# Patient Record
Sex: FEMALE | Race: WHITE
[De-identification: names, ages, dates, MRNs, and addresses within clinical notes are randomized per-mention and may not be internally consistent; named-entity substitution may affect disease eponyms.]

---

## 2020-02-20 ENCOUNTER — HOSPITAL ENCOUNTER (INPATIENT)
Dept: HOSPITAL 26 - MED | Age: 45
LOS: 5 days | Discharge: HOME | DRG: 330 | End: 2020-02-25
Attending: GENERAL PRACTICE | Admitting: GENERAL PRACTICE
Payer: COMMERCIAL

## 2020-02-20 VITALS — HEIGHT: 67 IN | WEIGHT: 206 LBS | BODY MASS INDEX: 32.33 KG/M2

## 2020-02-20 VITALS — SYSTOLIC BLOOD PRESSURE: 143 MMHG | DIASTOLIC BLOOD PRESSURE: 78 MMHG

## 2020-02-20 VITALS — SYSTOLIC BLOOD PRESSURE: 160 MMHG | DIASTOLIC BLOOD PRESSURE: 90 MMHG

## 2020-02-20 DIAGNOSIS — E87.6: ICD-10-CM

## 2020-02-20 DIAGNOSIS — E02: ICD-10-CM

## 2020-02-20 DIAGNOSIS — Z86.32: ICD-10-CM

## 2020-02-20 DIAGNOSIS — K56.1: Primary | ICD-10-CM

## 2020-02-20 DIAGNOSIS — E78.5: ICD-10-CM

## 2020-02-20 DIAGNOSIS — N39.0: ICD-10-CM

## 2020-02-20 DIAGNOSIS — E83.39: ICD-10-CM

## 2020-02-20 DIAGNOSIS — E83.51: ICD-10-CM

## 2020-02-20 DIAGNOSIS — E27.8: ICD-10-CM

## 2020-02-20 DIAGNOSIS — I10: ICD-10-CM

## 2020-02-20 DIAGNOSIS — D72.829: ICD-10-CM

## 2020-02-20 DIAGNOSIS — K44.9: ICD-10-CM

## 2020-02-20 DIAGNOSIS — R73.03: ICD-10-CM

## 2020-02-20 LAB
ALBUMIN FLD-MCNC: 3.4 G/DL (ref 3.4–5)
AMYLASE SERPL-CCNC: 77 U/L (ref 25–115)
ANION GAP SERPL CALCULATED.3IONS-SCNC: 16.4 MMOL/L (ref 8–16)
APPEARANCE UR: (no result)
AST SERPL-CCNC: 14 U/L (ref 15–37)
BARBITURATES UR QL SCN: (no result) NG/ML
BASOPHILS # BLD AUTO: 0 K/UL (ref 0–0.22)
BASOPHILS NFR BLD AUTO: 0.4 % (ref 0–2)
BENZODIAZ UR QL SCN: (no result) NG/ML
BILIRUB SERPL-MCNC: 0.3 MG/DL (ref 0–1)
BILIRUB UR QL STRIP: NEGATIVE
BUN SERPL-MCNC: 17 MG/DL (ref 7–18)
BZE UR QL SCN: (no result) NG/ML
CANNABINOIDS UR QL SCN: (no result) NG/ML
CHLORIDE SERPL-SCNC: 103 MMOL/L (ref 98–107)
CHOLEST/HDLC SERPL: 3.5 {RATIO} (ref 1–4.5)
CO2 SERPL-SCNC: 24.7 MMOL/L (ref 21–32)
COLOR UR: YELLOW
CREAT SERPL-MCNC: 0.8 MG/DL (ref 0.6–1.3)
EOSINOPHIL # BLD AUTO: 0.1 K/UL (ref 0–0.4)
EOSINOPHIL NFR BLD AUTO: 0.6 % (ref 0–4)
ERYTHROCYTE [DISTWIDTH] IN BLOOD BY AUTOMATED COUNT: 13.7 % (ref 11.6–13.7)
GFR SERPL CREATININE-BSD FRML MDRD: 101 ML/MIN (ref 90–?)
GLUCOSE SERPL-MCNC: 147 MG/DL (ref 74–106)
GLUCOSE UR STRIP-MCNC: NEGATIVE MG/DL
HCT VFR BLD AUTO: 37.6 % (ref 36–48)
HDLC SERPL-MCNC: 63 MG/DL (ref 40–60)
HGB BLD-MCNC: 12.5 G/DL (ref 12–16)
HGB UR QL STRIP: (no result)
LDLC SERPL CALC-MCNC: 127 MG/DL (ref 60–100)
LEUKOCYTE ESTERASE UR QL STRIP: (no result)
LIPASE SERPL-CCNC: 189 U/L (ref 73–393)
LYMPHOCYTES # BLD AUTO: 3.4 K/UL (ref 2.5–16.5)
LYMPHOCYTES NFR BLD AUTO: 35.3 % (ref 20.5–51.1)
MAGNESIUM SERPL-MCNC: 1.8 MG/DL (ref 1.8–2.4)
MCH RBC QN AUTO: 26 PG (ref 27–31)
MCHC RBC AUTO-ENTMCNC: 33 G/DL (ref 33–37)
MCV RBC AUTO: 77.9 FL (ref 80–94)
MONOCYTES # BLD AUTO: 0.6 K/UL (ref 0.8–1)
MONOCYTES NFR BLD AUTO: 6.8 % (ref 1.7–9.3)
NEUTROPHILS # BLD AUTO: 5.4 K/UL (ref 1.8–7.7)
NEUTROPHILS NFR BLD AUTO: 56.9 % (ref 42.2–75.2)
NITRITE UR QL STRIP: NEGATIVE
OPIATES UR QL SCN: (no result) NG/ML
PCP UR QL SCN: (no result) NG/ML
PH UR STRIP: 7.5 [PH] (ref 5–9)
PHOSPHATE SERPL-MCNC: 1.7 MG/DL (ref 2.5–4.9)
PLATELET # BLD AUTO: 403 K/UL (ref 140–450)
POTASSIUM SERPL-SCNC: 3.1 MMOL/L (ref 3.5–5.1)
PROTHROMBIN TIME: 9.3 SECS (ref 10.8–13.4)
RBC # BLD AUTO: 4.83 MIL/UL (ref 4.2–5.4)
RBC #/AREA URNS HPF: (no result) /HPF (ref 0–5)
SODIUM SERPL-SCNC: 141 MMOL/L (ref 136–145)
T4 FREE SERPL-MCNC: 1.07 NG/DL (ref 0.76–1.46)
TRIGL SERPL-MCNC: 157 MG/DL (ref 30–150)
TSH SERPL DL<=0.05 MIU/L-ACNC: 4.37 UIU/ML (ref 0.34–3.74)
WBC # BLD AUTO: 9.5 K/UL (ref 4.8–10.8)
WBC,URINE: (no result) /HPF (ref 0–5)

## 2020-02-20 PROCEDURE — C9113 INJ PANTOPRAZOLE SODIUM, VIA: HCPCS

## 2020-02-20 NOTE — NUR
PATIENT TAKEN TO CT IN [facility]. WILL GIVE MORPHINE AS ORDERED UPON RETURN.

## 2020-02-20 NOTE — NUR
PATIENT NG TUBE PLACED IN LEFT NARE. PATIENT TOLERATED WELL. STOMACH CONTENTS 
SEEN IN TUBE. 

-------------------------------------------------------------------------------

Addendum: 02/20/20 at 2123 by CODIE

-------------------------------------------------------------------------------

ASCULATED TO VERIFY PLACEMENT.  ORDERED XRAY TO VERIFY PLACEMENT.

## 2020-02-20 NOTE — NUR
PATIENT ADMITTED TO -A MED SURG UNIT. PATIENT ADMITTED UNDER THE CARE OF 
DR. DODD. REPORT GIVEN TO JANET KELLOGG. PATIENT STABLE AT TIME OF TRANSFER. KCL 
AND FLAGYL RUNNING IN [facility].

## 2020-02-20 NOTE — NUR
-------------------------------------------------------------------------------

            *** Note undone in EDM - 02/20/20 at 2040 by CODIE ***            

-------------------------------------------------------------------------------

PATEINT STATES SHE HAS HAD SUDDEN ONSET ABD PAIN ON AND OFF FOR 1 MONTH. STATES 
TODAY IT WAS WORSE. PATIENT DIAPHORETIC AND HUNCHED OVER IN PAIN DURING 
ASSESSMENT. MID ABD TENDERNESS NOTED. PATIENT STATES THE PAIN BEGAN 1 HOUR PTA 
AND IS GETTING WORSE. NO MEDICAL HX REPORTED. LUNGS CLEAR BILATERALLY. AAO. 
PATIENT LAYING IN BED, BE LOW LOCKED WITH SIDE RAIL UP ON ONE SIDE.  AT 
BEDSIDE.

## 2020-02-20 NOTE — NUR
RECEIVED BEDSIDE REPORT FROM [facility] RN. PT IS AWAKE BUT DROWSY. RESPONDS TO NAME. IS 
AAOX4. PT'S RESPIRATIONS ARE 12 EQUAL AND UNLABORED. LUNG SOUNDS ARE CLEAR. SKIN IS INTACT. 
C/C EPIGASTRIC PAIN X1 MO. WORSE TODAY. DX INTUSSUSCEPTION. WILL HAVE EMERGENCY SURGERY 
TONIGHT WITH SURGEON LORA. PT STATES LAST MEAL WAS TODAY AT 1800 AND HAD A BEER. 
CURRENTLY DENIES NAUSEA. HAS NGT ON L NOSTRIL TO GRAVITY. IV ON LAC 20G CURRENTLY ON K RIDER 
FOR K 3.1. PT NPO VERBALIZED UNDERSTANDING.  IS AT BEDSIDE. MRSA SWAB OBTAINED. VS: 
143/78 HR 87 RR 12 100% RA. PT WITH PAIN 8/10 HAS NOT HAD RELIEF PT RECEIVED MORPHINE BEFORE 
TRANSFERRING TO FLOOR. WILL REASSESS IN A FEW MINUTES. ORIENTED PT TO ROOM,STAFF, VISITING 
HOURS, AND CALL LIGHT. JUAN CARLOS OR RN IS AT BEDSIDE ALSO DOING HER ASSESSMENT.

## 2020-02-20 NOTE — NUR
PATEINT STATES SHE HAS HAD SUDDEN ONSET ABD PAIN ON AND OFF FOR 1 MONTH. STATES 
TODAY IT WAS WORSE. PATIENT DIAPHORETIC AND HUNCHED OVER IN PAIN DURING 
ASSESSMENT. MID ABD TENDERNESS NOTED. PATIENT STATES THE PAIN BEGAN 1 HOUR PTA 
AND IS GETTING WORSE. NO MEDICAL HX REPORTED. LUNGS CLEAR BILATERALLY. AAO. 
PATIENT LAYING IN BED, BE LOW LOCKED WITH SIDE RAIL UP ON ONE SIDE.  AT 
BEDSIDE.

## 2020-02-21 VITALS — SYSTOLIC BLOOD PRESSURE: 119 MMHG | DIASTOLIC BLOOD PRESSURE: 70 MMHG

## 2020-02-21 VITALS — SYSTOLIC BLOOD PRESSURE: 123 MMHG | DIASTOLIC BLOOD PRESSURE: 65 MMHG

## 2020-02-21 VITALS — SYSTOLIC BLOOD PRESSURE: 117 MMHG | DIASTOLIC BLOOD PRESSURE: 57 MMHG

## 2020-02-21 LAB
ANION GAP SERPL CALCULATED.3IONS-SCNC: 13.1 MMOL/L (ref 8–16)
BASOPHILS # BLD AUTO: 0 K/UL (ref 0–0.22)
BASOPHILS NFR BLD AUTO: 0.2 % (ref 0–2)
BUN SERPL-MCNC: 12 MG/DL (ref 7–18)
CHLORIDE SERPL-SCNC: 106 MMOL/L (ref 98–107)
CHOLEST/HDLC SERPL: 3.3 {RATIO} (ref 1–4.5)
CO2 SERPL-SCNC: 24 MMOL/L (ref 21–32)
CREAT SERPL-MCNC: 0.7 MG/DL (ref 0.6–1.3)
EOSINOPHIL # BLD AUTO: 0 K/UL (ref 0–0.4)
EOSINOPHIL NFR BLD AUTO: 0 % (ref 0–4)
ERYTHROCYTE [DISTWIDTH] IN BLOOD BY AUTOMATED COUNT: 13.6 % (ref 11.6–13.7)
GFR SERPL CREATININE-BSD FRML MDRD: 118 ML/MIN (ref 90–?)
GLUCOSE SERPL-MCNC: 186 MG/DL (ref 74–106)
HCT VFR BLD AUTO: 33 % (ref 36–48)
HDLC SERPL-MCNC: 67 MG/DL (ref 40–60)
HGB BLD-MCNC: 11.1 G/DL (ref 12–16)
LDLC SERPL CALC-MCNC: 123 MG/DL (ref 60–100)
LYMPHOCYTES # BLD AUTO: 0.3 K/UL (ref 2.5–16.5)
LYMPHOCYTES NFR BLD AUTO: 2 % (ref 20.5–51.1)
MCH RBC QN AUTO: 26 PG (ref 27–31)
MCHC RBC AUTO-ENTMCNC: 34 G/DL (ref 33–37)
MCV RBC AUTO: 78.3 FL (ref 80–94)
MONOCYTES # BLD AUTO: 1 K/UL (ref 0.8–1)
MONOCYTES NFR BLD AUTO: 5.9 % (ref 1.7–9.3)
NEUTROPHILS # BLD AUTO: 15.2 K/UL (ref 1.8–7.7)
NEUTROPHILS NFR BLD AUTO: 91.9 % (ref 42.2–75.2)
PLATELET # BLD AUTO: 261 K/UL (ref 140–450)
POTASSIUM SERPL-SCNC: 4.1 MMOL/L (ref 3.5–5.1)
RBC # BLD AUTO: 4.22 MIL/UL (ref 4.2–5.4)
SODIUM SERPL-SCNC: 139 MMOL/L (ref 136–145)
TRIGL SERPL-MCNC: 158 MG/DL (ref 30–150)
WBC # BLD AUTO: 16.5 K/UL (ref 4.8–10.8)

## 2020-02-21 PROCEDURE — 0DS80ZZ REPOSITION SMALL INTESTINE, OPEN APPROACH: ICD-10-PCS

## 2020-02-21 PROCEDURE — 3E1M38Z IRRIGATION OF PERITONEAL CAVITY USING IRRIGATING SUBSTANCE, PERCUTANEOUS APPROACH: ICD-10-PCS

## 2020-02-21 RX ADMIN — DEXTROSE AND SODIUM CHLORIDE SCH MLS/HR: 5; .9 INJECTION, SOLUTION INTRAVENOUS at 02:54

## 2020-02-21 RX ADMIN — Medication SCH DEV: at 20:29

## 2020-02-21 RX ADMIN — MORPHINE SULFATE PRN MG: 2 INJECTION, SOLUTION INTRAMUSCULAR; INTRAVENOUS at 18:34

## 2020-02-21 RX ADMIN — METHOCARBAMOL SCH MG: 100 INJECTION INTRAMUSCULAR; INTRAVENOUS at 20:11

## 2020-02-21 RX ADMIN — DEXTROSE AND SODIUM CHLORIDE SCH MLS/HR: 5; .9 INJECTION, SOLUTION INTRAVENOUS at 06:12

## 2020-02-21 RX ADMIN — PANTOPRAZOLE SODIUM SCH MG: 40 INJECTION, POWDER, FOR SOLUTION INTRAVENOUS at 08:48

## 2020-02-21 RX ADMIN — DEXTROSE SCH MLS/HR: 50 INJECTION, SOLUTION INTRAVENOUS at 11:58

## 2020-02-21 RX ADMIN — DEXTROSE AND SODIUM CHLORIDE SCH MLS/HR: 5; .9 INJECTION, SOLUTION INTRAVENOUS at 18:17

## 2020-02-21 RX ADMIN — MORPHINE SULFATE PRN MG: 2 INJECTION, SOLUTION INTRAMUSCULAR; INTRAVENOUS at 08:47

## 2020-02-21 RX ADMIN — MORPHINE SULFATE PRN MG: 2 INJECTION, SOLUTION INTRAMUSCULAR; INTRAVENOUS at 14:44

## 2020-02-21 RX ADMIN — Medication SCH DEV: at 17:04

## 2020-02-21 RX ADMIN — DEXTROSE SCH MLS/HR: 50 INJECTION, SOLUTION INTRAVENOUS at 06:12

## 2020-02-21 RX ADMIN — Medication SCH DEV: at 11:56

## 2020-02-21 RX ADMIN — MORPHINE SULFATE PRN MG: 2 INJECTION, SOLUTION INTRAMUSCULAR; INTRAVENOUS at 11:58

## 2020-02-21 RX ADMIN — DEXTROSE SCH MLS/HR: 50 INJECTION, SOLUTION INTRAVENOUS at 18:17

## 2020-02-21 NOTE — NUR
VITAL SIGNS ARE WITHIN NORMAL LIMITS. PT IS SLEEPING COMFORTABLY IN BED WITH EYES CLOSED. 
CHEST RISE AND FALL. CALL LIGHT IS WITHIN REACH. MOTHER IS AT BEDSIDE. WILL CONTINUE TO 
MONITOR PATIENT'S VITAL SIGNS.

## 2020-02-21 NOTE — NUR
PATIENT IS AWAKE, VERBALLY RESPONSIVE. PATIENT STILL C/O ABDOMINAL PAIN 6/10, MEDICATED WITH 
TORADOL 30MG IVP. NGT INTACT AND ON INTERMITTENT SUCTION. MERCADO CATHETER IN PLACE AND 
DRAINING CLEAR YELLOW COLORED URINE. CALL LIGHT WITHIN REACH. FAMILY AT BEDSIDE.

## 2020-02-21 NOTE — NUR
SPOKE W/ MD REGARDING PT C/O SWOLLEN HANDS. MD SAID TO DECREASE IV FLUID FROM 100 ML/HR TO 
80 ML/HR.

## 2020-02-21 NOTE — NUR
FLAGYL IS NOW INFUSING PER ORDERS. PT'S VSS REMAIN WITHIN NORMAL LIMITS. PER PT PAIN HAS 
IMPROVED SIGNIFICANTLY. PT STATES, "I HAVE A LITTLE BIT OF PAIN FROM SURGERY BUT, IT IS 
NOTHING COMPARED TO THE PAIN EARLIER. MOTHER IS AT BEDSIDE. CALL LIGHT IS WITHIN REACH. WILL 
CONTINUE TO MONITOR.

## 2020-02-21 NOTE — NUR
DC PLANN[age] YRS OLD FEMALE PATIENT WAS ADMITTED FROM HOME WITH A DX OF INTUSSUSCEPTION. HAS A HX OF 
GESTATIONAL DM AND METRORRHAGIA. CT ABD/PELVIS SHOWED INTUSSUSCEPTION INVOLVING THE SMALL 
BOWEL IN THE LEFT ABDOMEN.  INSERTED NGT STARTED ON IV FLAGYL PAIN CONTROL AND SURGERY 
CONSULT WITH DR PAULINO.  DC PLAN TO GO HOME WHEN STABLE CM TO FOLLOW

-------------------------------------------------------------------------------

Addendum: 20 at 1158 by Malissa Byers CM

-------------------------------------------------------------------------------

CURRENT LABS INCLUDE WBC 8.0, H/H 9.3/27.4, NA/K 142/3.4, BUN/CREA 4/0.6.  UDS SHOWED 
POSTIVE FOR CANNABINOIDS.  SURGICAL CONSULT IN PLACE.  POST OP #3 - S/P EX LAP WITH SMALL 
BOWEL RESECTION.  ON ZOSYN.  DC PLAN BACK TO HOME ONCE STABLE.

## 2020-02-21 NOTE — NUR
PATIENT IS BACK FROM OR BROUGHT IN BY BED. PT IS AAOX4. RESPIRATIONS ARE EQUAL AND 
UNLABORED. HAD EXPLORATORY LAPAROTOMY WITH SMALL BOWEL RESECTION WITH DR PAULINO. PT WITH 
VERTICAL INCISION COVERED WITH DRESSING WITH MINIMAL BLOODY DRAINAGE. SURGICAL INCISION WITH 
STAPLES PER REPORT.  VS:123/63 HR 85 98.3 RR 12 DENIES PAIN AT THIS TIME. PT WITH MERCADO WAS 
DRAINED BEFORE COMING. CONNECTED NGT TO INTERMITTENT SUCTION AS PER ORDERS. IV ON LAC 20G SL 
AND NEW IV ON R HAND 18G CONNECTED IVF 20MEQKCL/D5NS AT 150M/H FOR K 3.1. MOTHER AND  
ARE AT BEDSIDE. CALL LIGHT IS WITHIN REACH. WILL CONTINUE TO MONITOR.

-------------------------------------------------------------------------------

Addendum: 02/21/20 at 0323 by Vanesa Layne RN

-------------------------------------------------------------------------------

RECEIVED BEDSIDE REPORT FROM  JUAN CARLOS KELLOGG.

## 2020-02-21 NOTE — NUR
PATIENT MEDICATED FOR SEVERE PAIN TO ABDOMINAL INCISION WITH MORPHINE 3MG AS ORDERED. WILL 
CONTINUE TO MONITOR.

## 2020-02-21 NOTE — NUR
BEDSIDE REPORT RECEIVED FROM NIGHT NURSE. PATIENT IN STABLE CONDITION. PATIENT IS ASLEEP, 
EASILY AROUSABLE TO NAME OR TOUCH. SKIN WARM AND DRY TO TOUCH. NGT IN PLACE TO LEFT NOSTRIL, 
INTERMITTENT SUCTION INTACT WITH NO DRAINAGE NOTED. IV INTACT AND PATENT TO RIGHT HAND AND 
LEFT AC. ABDOMINAL INCISION INTACT WITH DRY DRESSING. MERCADO CATHETER IN PLACE, DRAINING 
YELLOW COLORED URINE. SAFETY MEASURES IN PLACE. CALL LIGHT WITHIN REACH.

## 2020-02-21 NOTE — NUR
RECEIVED BEDSIDE SHIFT REPORT FROM DAY SHIFT NURSE. TOOK OVER CARE FOR PT. PT IS AWAKE, 
A&OX4. ABLE TO FOLLOW COMMANDS. NGT IS IN PLACE IN LEFT NOSTRIL CONNECTED TO SUCTION SET ON 
INTERMITTENT SUCTIONING. SKIN IS DRY, WARM AND INTACT. MERCADO CATHETER IS NOTED W/ CLEAR 
YELLOW URINE W/ NO SEDIMENTS. IV CATHETERS NOTED IN LEFT AC 20G AND RIGHT HAND 18 G. BOTH IV 
SITES ARE INTACT AND PATENT. PT HAS D5 NS RUNNING  ML. ABDOMINAL INCISION IS DRY AND 
INTACT. PT HAS 5/10 ABDOMINAL PAIN. BED IS IN LOWEST POSITION AND LOCKED, PT PLACED IN SEMI 
FOWLERS POSITION AND CALL LIGHT IS IN REACH. WILL INITIATE PAIN CONTROL MEASURES.

## 2020-02-21 NOTE — NUR
NOTIFIED DR. MOSES IN REGARDS TO PATIENT HAVING SEVERE PAIN. PATIENT STATED SHE'S STILL 
HAVING INCREASED PAIN NOW. MORPHINE NOT DUE YET. DOCTOR NOTIFIED, DOCTOR TO SEE PATIENT.

## 2020-02-21 NOTE — NUR
AM MEDICATIONS GIVEN AS ORDERED. PATIENT IS AAOX4. PATIENT MEDICATED FOR SEVERE PAIN TO 
ABDOMINAL INCISION. NO DISTRESS NOTED. PT REMAIN STABLE. CALL LIGHT WITHIN REACH. FAMILY AT 
BEDSIDE.

## 2020-02-21 NOTE — NUR
PATIENT HAS BEEN SCREENED AND CATEGORIZED AS MODERATE NUTRITION RISK. PATIENT WILL BE SEEN 
WITHIN 3-5 DAYS OF ADMISSION.



02/23/20 02/25/20



VANESSA DOOLEY RD

## 2020-02-21 NOTE — NUR
PATIENT IS C/O SEVERE PAIN TO ABDOMEN, MEDICATED AS ORDERED. PATIENT IS AAOX4. NO S/S OF 
DISTRESS NOTED. NGT INTACT TO LOW INTERMITTENT SUCTIONING. NOTED OUTPUT 100ML, WITH DARK TO 
BROWN LIQUID. MERCADO INTACT AND PATENT WITH CLEAR YELLOW COLORED URINE. SAFETY MEASURES IN 
PLACE. WILL CONTINUE TO MONITOR. CALL LIGHT WITHIN REACH.

## 2020-02-22 VITALS — SYSTOLIC BLOOD PRESSURE: 123 MMHG | DIASTOLIC BLOOD PRESSURE: 68 MMHG

## 2020-02-22 VITALS — SYSTOLIC BLOOD PRESSURE: 110 MMHG | DIASTOLIC BLOOD PRESSURE: 61 MMHG

## 2020-02-22 VITALS — SYSTOLIC BLOOD PRESSURE: 119 MMHG | DIASTOLIC BLOOD PRESSURE: 69 MMHG

## 2020-02-22 LAB
ANION GAP SERPL CALCULATED.3IONS-SCNC: 8.2 MMOL/L (ref 8–16)
BASOPHILS # BLD AUTO: 0 K/UL (ref 0–0.22)
BASOPHILS NFR BLD AUTO: 0.2 % (ref 0–2)
BUN SERPL-MCNC: 10 MG/DL (ref 7–18)
CHLORIDE SERPL-SCNC: 108 MMOL/L (ref 98–107)
CO2 SERPL-SCNC: 27 MMOL/L (ref 21–32)
CREAT SERPL-MCNC: 0.7 MG/DL (ref 0.6–1.3)
EOSINOPHIL # BLD AUTO: 0 K/UL (ref 0–0.4)
EOSINOPHIL NFR BLD AUTO: 0.1 % (ref 0–4)
ERYTHROCYTE [DISTWIDTH] IN BLOOD BY AUTOMATED COUNT: 14.1 % (ref 11.6–13.7)
GFR SERPL CREATININE-BSD FRML MDRD: 118 ML/MIN (ref 90–?)
GLUCOSE SERPL-MCNC: 133 MG/DL (ref 74–106)
HCT VFR BLD AUTO: 30 % (ref 36–48)
HGB BLD-MCNC: 9.8 G/DL (ref 12–16)
LYMPHOCYTES # BLD AUTO: 0.9 K/UL (ref 2.5–16.5)
LYMPHOCYTES NFR BLD AUTO: 7 % (ref 20.5–51.1)
MAGNESIUM SERPL-MCNC: 1.7 MG/DL (ref 1.8–2.4)
MCH RBC QN AUTO: 26 PG (ref 27–31)
MCHC RBC AUTO-ENTMCNC: 33 G/DL (ref 33–37)
MCV RBC AUTO: 79.5 FL (ref 80–94)
MONOCYTES # BLD AUTO: 0.8 K/UL (ref 0.8–1)
MONOCYTES NFR BLD AUTO: 6.3 % (ref 1.7–9.3)
NEUTROPHILS # BLD AUTO: 11 K/UL (ref 1.8–7.7)
NEUTROPHILS NFR BLD AUTO: 86.4 % (ref 42.2–75.2)
PHOSPHATE SERPL-MCNC: 1.6 MG/DL (ref 2.5–4.9)
PLATELET # BLD AUTO: 228 K/UL (ref 140–450)
POTASSIUM SERPL-SCNC: 3.2 MMOL/L (ref 3.5–5.1)
RBC # BLD AUTO: 3.78 MIL/UL (ref 4.2–5.4)
SODIUM SERPL-SCNC: 140 MMOL/L (ref 136–145)
T3RU NFR SERPL: 16 % (ref 24–39)
T4 SERPL-MCNC: 11.9 UG/DL (ref 4.5–12)
WBC # BLD AUTO: 12.7 K/UL (ref 4.8–10.8)

## 2020-02-22 RX ADMIN — Medication SCH DEV: at 11:30

## 2020-02-22 RX ADMIN — DEXTROSE AND SODIUM CHLORIDE SCH MLS/HR: 5; .9 INJECTION, SOLUTION INTRAVENOUS at 04:38

## 2020-02-22 RX ADMIN — DEXTROSE, SODIUM CHLORIDE, AND POTASSIUM CHLORIDE SCH MLS/HR: 5; .45; .15 INJECTION INTRAVENOUS at 10:50

## 2020-02-22 RX ADMIN — PANTOPRAZOLE SODIUM SCH MG: 40 INJECTION, POWDER, FOR SOLUTION INTRAVENOUS at 09:06

## 2020-02-22 RX ADMIN — MORPHINE SULFATE PRN MG: 2 INJECTION, SOLUTION INTRAMUSCULAR; INTRAVENOUS at 15:45

## 2020-02-22 RX ADMIN — DEXTROSE SCH MLS/HR: 50 INJECTION, SOLUTION INTRAVENOUS at 19:50

## 2020-02-22 RX ADMIN — MORPHINE SULFATE PRN MG: 2 INJECTION, SOLUTION INTRAMUSCULAR; INTRAVENOUS at 11:37

## 2020-02-22 RX ADMIN — METHOCARBAMOL SCH MG: 100 INJECTION INTRAMUSCULAR; INTRAVENOUS at 14:00

## 2020-02-22 RX ADMIN — Medication SCH DEV: at 05:44

## 2020-02-22 RX ADMIN — METHOCARBAMOL SCH MG: 100 INJECTION INTRAMUSCULAR; INTRAVENOUS at 04:10

## 2020-02-22 RX ADMIN — Medication SCH DEV: at 21:06

## 2020-02-22 RX ADMIN — DEXTROSE SCH MLS/HR: 50 INJECTION, SOLUTION INTRAVENOUS at 23:57

## 2020-02-22 RX ADMIN — DEXTROSE SCH MLS/HR: 50 INJECTION, SOLUTION INTRAVENOUS at 05:37

## 2020-02-22 RX ADMIN — DEXTROSE, SODIUM CHLORIDE, AND POTASSIUM CHLORIDE SCH MLS/HR: 5; .45; .15 INJECTION INTRAVENOUS at 19:51

## 2020-02-22 RX ADMIN — DEXTROSE SCH MLS/HR: 50 INJECTION, SOLUTION INTRAVENOUS at 13:17

## 2020-02-22 RX ADMIN — METHOCARBAMOL SCH MG: 100 INJECTION INTRAMUSCULAR; INTRAVENOUS at 20:48

## 2020-02-22 RX ADMIN — Medication SCH DEV: at 16:30

## 2020-02-22 RX ADMIN — DEXTROSE SCH MLS/HR: 50 INJECTION, SOLUTION INTRAVENOUS at 00:44

## 2020-02-22 NOTE — NUR
CHECKED UP ON PT. PT ASLEEP IN SEMI FOWLERS POSITION. BED IN LOWEST POSITION, LOCKED. CALL 
LIGHT WITHIN REACH. WILL CONTINUE TO MONITOR PT.

## 2020-02-22 NOTE — NUR
RECEIVED BEDSIDE REPORT FROM DAY SHIFT NURSE AND TOOK OVER CARE OF PT.  PT IS A&OX4, ABLE TO 
FOLLOW COMMANDS. NGT IN PLACE IN LEFT NOSTRIL CONNECTED TO SUCTION. 250 ML INSIDE SUCTION 
CONTAINER NOTED. PT IS AMBULATORY AND IS ABLE TO WALK TO BATHROOM W/ ASSISTANCE. PT HAS 
RIGHT AC 20G, ASYMPTOMATIC, PATENT AND INTACT. ABDOMINAL DRESSINGS IS CDI. PT IS UNABLE TO 
HAVE FLATUS. UNABLE TO DEFECATE. PT STATED SHE HAS 6/10 ABDOMINAL PAIN. BED IS SEMI FOWLERS 
IN LOWEST POSITION, LOCKED. LIFESAVING EQUIPMENT IS PLUGGED IN RED OUTLETS. CALL LIGHT IS 
WITHIN REACH.

## 2020-02-22 NOTE — NUR
CHECKED UP ON PT. PT ASLEEP. NO DISTRESS NOTED. BED IS LOWEST POSITION, LOCKED AND CALL 
LIGHT IS WITHIN REACH. WILL CONTINUE TO MONITOR

## 2020-02-22 NOTE — NUR
ENDORSED PT TO DAY SHIFT NURSE JOHANA KELLOGG, PT STABLE, NO DISTRESS NOTED, CALL LIGHT WITHIN 
REACH.

## 2020-02-22 NOTE — NUR
ASSISTED PT WITH AMBULATING. PT AMBULATED OUT OF ROOM ABOUT 10FT. PT REQUEST TO RETURN TO 
ROOM DUE TO FEELING DIZZY. WILL CONTINUE TO MONITOR.

## 2020-02-22 NOTE — NUR
ASSESSED PT BG. . ADMINISTERED SCHEDULED MEDICATIONS. BED LOWEST POSITION, LOCKED AND 
CALL LIGHT WITHIN REACH. WILL CONTINUE TO MONITOR

## 2020-02-22 NOTE — NUR
CHECKED IN ON PT. PT ASLEEP. NO DISTRESS NOTED. BED LOCK, LOWEST POSITION, AND CALL LIGHT 
WITHIN REACH. WILL CONTINUE TO MONITOR

## 2020-02-22 NOTE — NUR
RECEIVED BEDSIDE REPORT FROM [facility] NURSE DIEGO. PATIENT IN STABLE CONDITION. SKIN 
WARM AND DRY TO TOUCH. NGT IN PLACE TO LEFT NOSTRIL, INTERMITTENT SUCTION INTACT WITH NO 
DRAINAGE NOTED. RESPIRATIONS EVEN AND UNLABORED. IV INTACT AND PATENT. ABDOMINAL INCISION 
INTACT WITH DRY DRESSING. MERCADO CATHETER IN PLACE. SAFETY MEASURES IN PLACE. BED IN LOW 
POSITION. CALL LIGHT WITHIN REACH AT BEDSIDE. WILL CONTINUE TO MONITOR.

## 2020-02-23 VITALS — DIASTOLIC BLOOD PRESSURE: 71 MMHG | SYSTOLIC BLOOD PRESSURE: 118 MMHG

## 2020-02-23 VITALS — DIASTOLIC BLOOD PRESSURE: 77 MMHG | SYSTOLIC BLOOD PRESSURE: 123 MMHG

## 2020-02-23 VITALS — DIASTOLIC BLOOD PRESSURE: 75 MMHG | SYSTOLIC BLOOD PRESSURE: 132 MMHG

## 2020-02-23 LAB
ANION GAP SERPL CALCULATED.3IONS-SCNC: 11.7 MMOL/L (ref 8–16)
BASOPHILS # BLD AUTO: 0 K/UL (ref 0–0.22)
BASOPHILS NFR BLD AUTO: 0.3 % (ref 0–2)
BUN SERPL-MCNC: 5 MG/DL (ref 7–18)
CHLORIDE SERPL-SCNC: 106 MMOL/L (ref 98–107)
CO2 SERPL-SCNC: 25.8 MMOL/L (ref 21–32)
CREAT SERPL-MCNC: 0.8 MG/DL (ref 0.6–1.3)
EOSINOPHIL # BLD AUTO: 0.1 K/UL (ref 0–0.4)
EOSINOPHIL NFR BLD AUTO: 0.5 % (ref 0–4)
ERYTHROCYTE [DISTWIDTH] IN BLOOD BY AUTOMATED COUNT: 13.6 % (ref 11.6–13.7)
GFR SERPL CREATININE-BSD FRML MDRD: 101 ML/MIN (ref 90–?)
GLUCOSE SERPL-MCNC: 137 MG/DL (ref 74–106)
HCT VFR BLD AUTO: 28.9 % (ref 36–48)
HGB BLD-MCNC: 9.8 G/DL (ref 12–16)
LYMPHOCYTES # BLD AUTO: 0.8 K/UL (ref 2.5–16.5)
LYMPHOCYTES NFR BLD AUTO: 7.2 % (ref 20.5–51.1)
MAGNESIUM SERPL-MCNC: 1.9 MG/DL (ref 1.8–2.4)
MCH RBC QN AUTO: 26 PG (ref 27–31)
MCHC RBC AUTO-ENTMCNC: 34 G/DL (ref 33–37)
MCV RBC AUTO: 78 FL (ref 80–94)
MONOCYTES # BLD AUTO: 0.7 K/UL (ref 0.8–1)
MONOCYTES NFR BLD AUTO: 6.3 % (ref 1.7–9.3)
NEUTROPHILS # BLD AUTO: 9.7 K/UL (ref 1.8–7.7)
NEUTROPHILS NFR BLD AUTO: 85.7 % (ref 42.2–75.2)
PHOSPHATE SERPL-MCNC: 1.7 MG/DL (ref 2.5–4.9)
PLATELET # BLD AUTO: 233 K/UL (ref 140–450)
POTASSIUM SERPL-SCNC: 3.5 MMOL/L (ref 3.5–5.1)
RBC # BLD AUTO: 3.7 MIL/UL (ref 4.2–5.4)
SODIUM SERPL-SCNC: 140 MMOL/L (ref 136–145)
WBC # BLD AUTO: 11.3 K/UL (ref 4.8–10.8)

## 2020-02-23 RX ADMIN — METHOCARBAMOL SCH MG: 100 INJECTION INTRAMUSCULAR; INTRAVENOUS at 20:46

## 2020-02-23 RX ADMIN — MORPHINE SULFATE PRN MG: 2 INJECTION, SOLUTION INTRAMUSCULAR; INTRAVENOUS at 09:20

## 2020-02-23 RX ADMIN — DEXTROSE SCH MLS/HR: 50 INJECTION, SOLUTION INTRAVENOUS at 13:24

## 2020-02-23 RX ADMIN — Medication SCH DEV: at 07:30

## 2020-02-23 RX ADMIN — DEXTROSE SCH MLS/HR: 50 INJECTION, SOLUTION INTRAVENOUS at 23:38

## 2020-02-23 RX ADMIN — METHOCARBAMOL SCH MG: 100 INJECTION INTRAMUSCULAR; INTRAVENOUS at 13:10

## 2020-02-23 RX ADMIN — PANTOPRAZOLE SODIUM SCH MG: 40 INJECTION, POWDER, FOR SOLUTION INTRAVENOUS at 09:04

## 2020-02-23 RX ADMIN — Medication SCH DEV: at 20:52

## 2020-02-23 RX ADMIN — METHOCARBAMOL SCH MG: 100 INJECTION INTRAMUSCULAR; INTRAVENOUS at 05:34

## 2020-02-23 RX ADMIN — MORPHINE SULFATE PRN MG: 2 INJECTION, SOLUTION INTRAMUSCULAR; INTRAVENOUS at 03:02

## 2020-02-23 RX ADMIN — MORPHINE SULFATE PRN MG: 2 INJECTION, SOLUTION INTRAMUSCULAR; INTRAVENOUS at 15:27

## 2020-02-23 RX ADMIN — Medication SCH DEV: at 16:30

## 2020-02-23 RX ADMIN — DEXTROSE SCH MLS/HR: 50 INJECTION, SOLUTION INTRAVENOUS at 17:48

## 2020-02-23 RX ADMIN — DEXTROSE, SODIUM CHLORIDE, AND POTASSIUM CHLORIDE SCH MLS/HR: 5; .45; .15 INJECTION INTRAVENOUS at 15:10

## 2020-02-23 RX ADMIN — DEXTROSE SCH MLS/HR: 50 INJECTION, SOLUTION INTRAVENOUS at 05:35

## 2020-02-23 RX ADMIN — Medication SCH DEV: at 12:28

## 2020-02-23 RX ADMIN — DEXTROSE, SODIUM CHLORIDE, AND POTASSIUM CHLORIDE SCH MLS/HR: 5; .45; .15 INJECTION INTRAVENOUS at 06:50

## 2020-02-23 NOTE — NUR
MANASA PALMA AT BEDSIDE FOR WOUND ASSESSMENT AND DRESSING CHANGE. PATIENT TOLERATED WELL. 
NO SIGNS OF DISTRESS NOTED.

## 2020-02-23 NOTE — NUR
DUE ZOSYN IVPB ADMINISTERED, DENIES ANY PAIN AT THIS TIME, PT MADE AWARE THAT I HAVE TO TAKE 
A PICTURE OF HER INCISION, PT STATED NOT RIGHT NOW STATED FIRST DRESSING CHANGE WAS DONE 
EARLIER, WANTS IT DURING THE NEXT DRESSING CHANGE, CHARGE NURSE BRITNEY MADE AWARE AND WILL 
ENDORSE TO AM NURSE.

## 2020-02-23 NOTE — NUR
PT AMBULATED TO BR WITH STANDBY ASSIST, VOIDED FREELY, PT STATED STARTED HAVING MINIMAL 
BLEEDING DUE TO MENSTRUAL CYCLE, SANITARY PAD PROVIDED, ALL NEEDS ATTENDED.

## 2020-02-23 NOTE — NUR
BLOOD SUGAR CHECKED WITH 123 RESULT, DUE ROBAXIN IVP GIVEN FOR PAIN, PT COMFORTABLE ON BED 
AT THIS TIME, MONITORED CLOSELY.

## 2020-02-23 NOTE — NUR
ADMINISTERED PRN PAIN MEDICATION PER PATIENT REQUEST. PATIENT TOLERATED WELL. NO SIGNS OF 
DISTRESS NOTED. RESPIRATIONS EVEN AND UNLABORED. BED IN LOW POSITION AND CALL LIGHT AT 
BEDSIDE. WILL CONTINUE TO MONITOR.

## 2020-02-23 NOTE — NUR
CHECKED ON PT. PT ASLEEP. BED IN LOWEST SETTING, LOCKED. CALL LIGHT WITHIN REACH. WILL 
CONTINUE TO MONITOR

## 2020-02-23 NOTE — NUR
RECEIVED BEDSIDE REPORT FROM NIGHT SHIFT NURSE VALENTIN. PATIENT IN STABLE CONDITION. SKIN WARM 
AND DRY TO TOUCH. RESPIRATIONS EVEN AND UNLABORED. IV INTACT AND PATENT. ABDOMINAL INCISION 
INTACT WITH DRY DRESSING. SAFETY MEASURES IN PLACE. BED IN LOW POSITION. CALL LIGHT WITHIN 
REACH AT BEDSIDE. WILL CONTINUE TO MONITOR.

## 2020-02-23 NOTE — NUR
ADMINISTERED PRN PAIN MEDICATION PER PATIENT REQUEST. PT TOLERATED WELL. NO SIGNS OF 
DISTRESS NOTED. PT RESPIRATIONS EVEN AND UNLABORED. WILL CONTINUE TO MONITOR.

## 2020-02-23 NOTE — NUR
PT EATING LUNCH CLEAR LIQUID DIET. TOLERATING WELL. BED IN LOW POSITION. CALL LIGHT AT 
BEDSIDE. WILL CONTINUE TO MONITOR.

## 2020-02-23 NOTE — NUR
PT AWAKE SITTING UP ON BED, NO SIGNS OF DISTRESS, REPORT GIVEN TO BESS VAUGHN FOR CONTINUITY OF 
CARE.

## 2020-02-23 NOTE — NUR
PT IS AMBULATING AROUND THE UNIT WITH FAMILY. NO DISTRESS NOTED. PATIENT IN STABLE 
CONDITION. WILL CONTINUE TO MONITOR

## 2020-02-23 NOTE — NUR
RECEIVED PT ON BED, ON HIGH FOWLERS POSITION, PT IN PAIN BUT CAN WAIT FOR THE NEXT ROBAXIN 
DOSE, PT STATED PASSING GAS AND BURPING TODAY, TOLERATING CLEAR LIQUID DIET, IVF INFUSING 
WELL, ABDOMINAL DRESSING DRY AND INTACT, ABDOMINAL BINDER IN PLACE, PLAN OF CARE DISCUSSED, 
SAFETY MEASURES IN PLACE, CALL LIGHT WITHIN REACH.

## 2020-02-23 NOTE — NUR
CHECKED IN ON PT. PT IN NEED TO VOID. ASSISTED PT TO RESTROOM. NGT RESIDUAL  ML.

-------------------------------------------------------------------------------

Addendum: 02/23/20 at 0406 by Ricardo Forde RN RN

-------------------------------------------------------------------------------

PT HAD ABDOMINAL PAIN. SEE PAIN ASSESSMENT/REASSESSMENT

## 2020-02-23 NOTE — NUR
PT CAME OUT OF THE TOILET WITH NGT ON HER HAND, STATED TO RICKI SARGENT THAT IT JUST CAME OUT, 
AND STATED SHE DOESN'T WANT IT BACK BECAUSE HER THROAT IS ALREADY HURTING, DR MACIEL MADE 
AWARE, STATED WILL CHECK PT.

## 2020-02-24 VITALS — DIASTOLIC BLOOD PRESSURE: 76 MMHG | SYSTOLIC BLOOD PRESSURE: 133 MMHG

## 2020-02-24 VITALS — SYSTOLIC BLOOD PRESSURE: 120 MMHG | DIASTOLIC BLOOD PRESSURE: 74 MMHG

## 2020-02-24 VITALS — DIASTOLIC BLOOD PRESSURE: 77 MMHG | SYSTOLIC BLOOD PRESSURE: 122 MMHG

## 2020-02-24 LAB
ANION GAP SERPL CALCULATED.3IONS-SCNC: 8.9 MMOL/L (ref 8–16)
BASOPHILS # BLD AUTO: 0.1 K/UL (ref 0–0.22)
BASOPHILS NFR BLD AUTO: 0.7 % (ref 0–2)
BUN SERPL-MCNC: 4 MG/DL (ref 7–18)
CHLORIDE SERPL-SCNC: 107 MMOL/L (ref 98–107)
CO2 SERPL-SCNC: 29.5 MMOL/L (ref 21–32)
CREAT SERPL-MCNC: 0.6 MG/DL (ref 0.6–1.3)
EOSINOPHIL # BLD AUTO: 0.2 K/UL (ref 0–0.4)
EOSINOPHIL NFR BLD AUTO: 1.9 % (ref 0–4)
ERYTHROCYTE [DISTWIDTH] IN BLOOD BY AUTOMATED COUNT: 13.7 % (ref 11.6–13.7)
GFR SERPL CREATININE-BSD FRML MDRD: 141 ML/MIN (ref 90–?)
GLUCOSE SERPL-MCNC: 128 MG/DL (ref 74–106)
HCT VFR BLD AUTO: 27.4 % (ref 36–48)
HGB BLD-MCNC: 9.3 G/DL (ref 12–16)
LYMPHOCYTES # BLD AUTO: 0.8 K/UL (ref 2.5–16.5)
LYMPHOCYTES NFR BLD AUTO: 9.8 % (ref 20.5–51.1)
MCH RBC QN AUTO: 26 PG (ref 27–31)
MCHC RBC AUTO-ENTMCNC: 34 G/DL (ref 33–37)
MCV RBC AUTO: 77.5 FL (ref 80–94)
MONOCYTES # BLD AUTO: 0.6 K/UL (ref 0.8–1)
MONOCYTES NFR BLD AUTO: 8 % (ref 1.7–9.3)
NEUTROPHILS # BLD AUTO: 6.4 K/UL (ref 1.8–7.7)
NEUTROPHILS NFR BLD AUTO: 79.6 % (ref 42.2–75.2)
PLATELET # BLD AUTO: 240 K/UL (ref 140–450)
POTASSIUM SERPL-SCNC: 3.4 MMOL/L (ref 3.5–5.1)
RBC # BLD AUTO: 3.53 MIL/UL (ref 4.2–5.4)
SODIUM SERPL-SCNC: 142 MMOL/L (ref 136–145)
WBC # BLD AUTO: 8 K/UL (ref 4.8–10.8)

## 2020-02-24 RX ADMIN — MORPHINE SULFATE PRN MG: 2 INJECTION, SOLUTION INTRAMUSCULAR; INTRAVENOUS at 12:30

## 2020-02-24 RX ADMIN — SODIUM CHLORIDE SCH MLS/HR: 9 INJECTION, SOLUTION INTRAVENOUS at 20:24

## 2020-02-24 RX ADMIN — METHOCARBAMOL SCH MG: 100 INJECTION INTRAMUSCULAR; INTRAVENOUS at 04:07

## 2020-02-24 RX ADMIN — PANTOPRAZOLE SODIUM SCH MG: 40 INJECTION, POWDER, FOR SOLUTION INTRAVENOUS at 08:38

## 2020-02-24 RX ADMIN — Medication SCH DEV: at 06:33

## 2020-02-24 RX ADMIN — DEXTROSE, SODIUM CHLORIDE, AND POTASSIUM CHLORIDE SCH MLS/HR: 5; .45; .15 INJECTION INTRAVENOUS at 05:14

## 2020-02-24 RX ADMIN — SODIUM CHLORIDE SCH MLS/HR: 9 INJECTION, SOLUTION INTRAVENOUS at 09:20

## 2020-02-24 RX ADMIN — METHOCARBAMOL SCH MG: 100 INJECTION INTRAMUSCULAR; INTRAVENOUS at 13:01

## 2020-02-24 RX ADMIN — DEXTROSE SCH MLS/HR: 50 INJECTION, SOLUTION INTRAVENOUS at 05:14

## 2020-02-24 RX ADMIN — DEXTROSE SCH MLS/HR: 50 INJECTION, SOLUTION INTRAVENOUS at 11:55

## 2020-02-24 NOTE — NUR
PT SITTING UP IN BED IN STABLE CONDITION. RECEIVED REPORT FROM [facility] DAYSHIFT NURSE AT 
BEDSIDE FOR CONTINUITY OF CARE. PT C/OF NUMBNESS IN FEET AND HANDS, WILL SPEAK TO RESIDENT 
MD. V/S AS FOLLOWS: T 98.7 P 95 R 18 B/P 128/80 02 95%. PT WAS GOING TO AMBULATE, BUT 
STOPPED WHEN SHE FELT THE NUMBNESS AND TINGLING. ALL FALLS PRECAUTIONS IN PLACE.

## 2020-02-24 NOTE — NUR
HANG ZOSYN VIA IVPB AS SCHEDULED. EXPLAINED TO PATIENT MED AND SIDE EFFECTS. PATIENT 
VERBALIZED UNDERSTANDING. WILL CONTINUE TO MONITOR

## 2020-02-24 NOTE — NUR
DR. PAULINO MADE A VERBAL ORDER TO CHANGE PATIENT'S DIET TO REGULAR. ALSO STAPLES INTACT, 
APPLY BETADINE AND OPEN TO AIR

## 2020-02-24 NOTE — NUR
GIVEN MORPHINE FOR 8/10 ABDOMINAL PAIN. EXPLAINED TO PT MED AND SIDE EFFECTS. PATIENT 
VERBALIZED UNDERSTANDING. BED IN LOW POSITION. WILL CONTINUE TO MONITOR

## 2020-02-24 NOTE — NUR
RECEIVED PATIENT FROM NIGHT SHIFT NURSE FOR CONTINUITY OF CARE. PATIENT IS AWAKE, ENGLISH 
SPEAKING. RESPIRATIONS EVEN AND UNLABORED, ROOM AIR. VISIBLE CHEST RISE NOTED. MED-SURG. 
ABDOMEN SOFT, ROUND, TENDER. BOWEL SOUNDS HYPOACTIVE. S/P LAP CHOLECYSTECTOMY, STAPLED. 
DRESSING DRY AND INTACT. ABDOMINAL BINDER IN PLACE. AMBULATORY WITH ASSIST, UNIVERSAL FALL 
PRECAUTIONS. BED IN LOW POSITION. CALL LIGHT IS WITHIN REACH. WILL CONTINUE TO MONITOR.

## 2020-02-24 NOTE — NUR
ENDORSED PATIENT TO THE NIGHT SHIFT NURSE BRITNEY FOR CONTINUITY OF CARE. PATIENT IS IN STABLE 
CONDITION

## 2020-02-24 NOTE — NUR
GIVEN ROBAXIN IVP FOR 3 MINUTES. INSTRUCTED TO HAVE PATIENT LIE BEFORE AND AFTER MEDICATION 
ADMINISTRATION. EXPLAIN MED AND SIDE EFFECTS. PATIENT VERBALIZED UNDERSTANDING. BED IN LOW 
POSITION. CALL LIGHT IS WITHIN REACH

## 2020-02-24 NOTE — NUR
HANG SODIUM PHOSPHATE VIA IVPB. PHOSPHORUS LEVEL IS 1.7. EXPLAINED TO PATIENT FAMILY MED AND 
SIDE EFFECTS. PATIENT AND FAMILY VERBALIZED UNDERSTANDING. WILL CONTINUE TO MONITOR.

## 2020-02-24 NOTE — NUR
REASSESSED PAIN. PATIENT STATED 2/10 PAIN, TOLERABLE PAIN. WILL CONTINUE TO MONITOR. FAMILY 
AT BEDSIDE

## 2020-02-24 NOTE — NUR
PT COMPLAINING OF NAUSEA, NO VOMITING NOTED, STATED STILL PASSING GAS, MEDICATED PRN WITH 
ZOFRAN IVP, MONITORED CLOSELY.

## 2020-02-24 NOTE — NUR
SPOKE WITH MD DR. NEWMAN, HE WAS MADE AWARE OF NUMBNESS AND TINGLING, HE SUGGESTED PT AMBULATE 
IN THE HALLWAY. PT ALSO MADE AWARE OF THE SIDE EFFECTS OF HER MEDICATION. ALL REQUESTED 
NEEDS ATTENDED BY STAFF. AND CALL BELL IN REACH. LABS REVIEWED, FLUIDS CHANGED FORM N/S WITH 
POTASSIUM TO JUST NORMAL SALINE SOLUTION AT 80MLS/HR. STAPLES INTACT AND ASYMPTOMATIC IV 
SITE INTACT AND FLUSHED PATENT.

## 2020-02-24 NOTE — NUR
HELPED PATIENT TO AMBULATE TO THE BATHROOM TO VOID. PATIENT STATED IT PAINS HER TO MOVE. 
REFUSED TO HAVE ANOTHER MORE PAIN MEDICATIONS. FAMILY AT BEDSIDE. WILL CONTINUE TO MONITOR

## 2020-02-24 NOTE — NUR
PT IN BED SLEEPING, SHE WOKE UP AND WAS ASSISTED TO BATHROOM AND BACK. PT STAPLES INTACT AND 
ASYMPTOMATIC. PT DENIES ANY PAIN, ALL REQUESTED NEEDS ATTENDED BY STAFF. IV SITE INTACT AND 
RUNNING N/S AT 80MLS/HR. ALL UNIVERSAL FALLS PRECAUTIONS IN PLACE.

## 2020-02-24 NOTE — NUR
DUE ZOSYN IVPB ADMINISTERED, VERBALIZED NAUSEA RELIEF AFTER ZOFRAN, BLOOD SUGAR CHECKED WITH 
114 RESULT, IVF INFUSING WELL, DRESSING DRY AND INTACT, ABDOMINAL BINDER IN PLACE, MONITORED 
CLOSELY.

## 2020-02-24 NOTE — NUR
PT SLEEPING, EASILY AROUSABLE, DUE ROBAXIN ADMINISTERED VIA SLOW IV PUSH, PT STATED CONTINUE 
TO PASS GAS AND FEEL LESS BLOATED, MONITORED CLOSELY.

## 2020-02-24 NOTE — NUR
GIVEN PROTONIX VIA IVP AS SCHEDULED. EXPLAINED TO PATIENT MED AND SIDE EFFECTS. PATIENT 
VERBALIZED UNDERSTANDING.BED IN LOW POSITION. CALL LIGHT IS WITHIN REACH. WILL CONTINUE TO 
MONITOR

## 2020-02-25 VITALS — DIASTOLIC BLOOD PRESSURE: 73 MMHG | SYSTOLIC BLOOD PRESSURE: 122 MMHG

## 2020-02-25 VITALS — DIASTOLIC BLOOD PRESSURE: 75 MMHG | SYSTOLIC BLOOD PRESSURE: 122 MMHG

## 2020-02-25 LAB
ANION GAP SERPL CALCULATED.3IONS-SCNC: 10.4 MMOL/L (ref 8–16)
BASOPHILS # BLD AUTO: 0 K/UL (ref 0–0.22)
BASOPHILS NFR BLD AUTO: 0.4 % (ref 0–2)
BUN SERPL-MCNC: 5 MG/DL (ref 7–18)
CHLORIDE SERPL-SCNC: 108 MMOL/L (ref 98–107)
CO2 SERPL-SCNC: 27.9 MMOL/L (ref 21–32)
CREAT SERPL-MCNC: 0.6 MG/DL (ref 0.6–1.3)
EOSINOPHIL # BLD AUTO: 0.2 K/UL (ref 0–0.4)
EOSINOPHIL NFR BLD AUTO: 3.9 % (ref 0–4)
ERYTHROCYTE [DISTWIDTH] IN BLOOD BY AUTOMATED COUNT: 13.5 % (ref 11.6–13.7)
GFR SERPL CREATININE-BSD FRML MDRD: 141 ML/MIN (ref 90–?)
GLUCOSE SERPL-MCNC: 100 MG/DL (ref 74–106)
HCT VFR BLD AUTO: 29.3 % (ref 36–48)
HGB BLD-MCNC: 9.5 G/DL (ref 12–16)
LYMPHOCYTES # BLD AUTO: 1.2 K/UL (ref 2.5–16.5)
LYMPHOCYTES NFR BLD AUTO: 20.5 % (ref 20.5–51.1)
MCH RBC QN AUTO: 26 PG (ref 27–31)
MCHC RBC AUTO-ENTMCNC: 32 G/DL (ref 33–37)
MCV RBC AUTO: 81.4 FL (ref 80–94)
MONOCYTES # BLD AUTO: 0.6 K/UL (ref 0.8–1)
MONOCYTES NFR BLD AUTO: 10.3 % (ref 1.7–9.3)
NEUTROPHILS # BLD AUTO: 3.8 K/UL (ref 1.8–7.7)
NEUTROPHILS NFR BLD AUTO: 64.9 % (ref 42.2–75.2)
PLATELET # BLD AUTO: 292 K/UL (ref 140–450)
POTASSIUM SERPL-SCNC: 4.3 MMOL/L (ref 3.5–5.1)
RBC # BLD AUTO: 3.61 MIL/UL (ref 4.2–5.4)
SODIUM SERPL-SCNC: 142 MMOL/L (ref 136–145)
WBC # BLD AUTO: 5.8 K/UL (ref 4.8–10.8)

## 2020-02-25 RX ADMIN — SODIUM CHLORIDE SCH MLS/HR: 9 INJECTION, SOLUTION INTRAVENOUS at 10:20

## 2020-02-25 RX ADMIN — PANTOPRAZOLE SODIUM SCH MG: 40 INJECTION, POWDER, FOR SOLUTION INTRAVENOUS at 08:42

## 2020-02-25 NOTE — NUR
PATIENT IS SHOWERING AT THIS MOMENT. COVER SURGICAL INCISION AND IV SITE WITH PLASTIC BAG TO 
PREVENT WETTING. MOM IS ASSISTING PATIENT

## 2020-02-25 NOTE — NUR
PT IN BED ASLEEP NO S/S OF PAIN OR DISTRESS NOTED. IV FLUIDS RUNNING AS ORDERED. V/S AS 
ORDERED T 98.4 P 95 R 18 B/P 122/75 02 98% ON ROOM AIR. ALL UNIVERSAL FALLS PRECAUTIONS IN 
PLACE AS WELL AS SCD'S.

## 2020-02-25 NOTE — NUR
GIVEN NEUTRA-PHOS. EXPLAINED TO PATIENT MED. PATIENT VERBALIZED UNDERSTANDING. WILL CONTINUE 
TO MONITOR

## 2020-02-25 NOTE — NUR
DISCHARGED PATIENT VIA WHEELCHAIR. PATIENT DENIES ANY PAIN. NO SOB. FAMILY IS WITH PATIENT. 
 WILL DRIVE HOME. PATIENT IS IN STABLE CONDITION.

## 2020-02-25 NOTE — NUR
GIVEN PROTONIX VIA IVP AS SCHEDULED. EXPLAINED TO PATIENT MED AND SIDE EFFECTS. PATIENT 
VERBALIZED UNDERSTANDING. WILL CONTINUE TO MONITOR

## 2020-02-25 NOTE — NUR
GIVEN DISCHARGE INSTRUCTIONS. INSTRUCTED PATIENT TO NOT SWIM, HOT TUBS BECAUSE OF THE 
SURGICAL INCISION. EXPLAINED THAT SHE HAS APPT WITH HER PCP THIS 28TH AND TO FOLLOW UP WITH 
DR. PAULINO. EXPLAINED THAT RESIDENT DOCTOR PRESCRIBED MEDICATIONS AND THAT SHE NEEDS TO 
 FROM HER PREFERRED PHARMACY. PATIENT VERBALIZED UNDERSTANDING. NO FUTHER QUESTIONS.

## 2020-02-25 NOTE — NUR
CLEANED WOUND WITH BETADINE, PAT DRIED, AND OPEN TO AIR. DISCONTINUED IV AND REMOVED ID 
BAND. PATIENT REFUSED FLU VACCINE. PNA VACCINE IS NOT APPLICABLE.

## 2020-02-25 NOTE — NUR
RECEIVED PATIENT FROM NIGHT SHIFT NURSE FOR CONTINUITY OF CARE. PATIENT IS AWAKE, ENGLISH 
SPEAKING. RESPIRATIONS EVEN AND UNLABORED, ROOM AIR. VISIBLE CHEST RISE NOTED. MED-SURG. 
ABDOMEN SOFT, ROUND, TENDER. BOWEL SOUNDS HYPOACTIVE. S/P LAP CHOLECYSTECTOMY, STAPLES 
INTACT. OPEN TO AIR. AMBULATORY WITH ASSIST, UNIVERSAL FALL PRECAUTIONS. BED IN LOW 
POSITION. CALL LIGHT IS WITHIN REACH. WILL CONTINUE TO MONITOR

## 2020-02-25 NOTE — NUR
PT IN BED AWAKE FOR BLOOD DRAWS. IV SITE INTACT AND RUNNING FLUIDS AS ORDERED. ALL UNIVERSAL 
FALLS PRECAUTIONS IN PLACE,

## 2023-09-07 ENCOUNTER — HOSPITAL ENCOUNTER (EMERGENCY)
Dept: HOSPITAL 26 - MED | Age: 48
Discharge: HOME | End: 2023-09-07
Payer: COMMERCIAL

## 2023-09-07 VITALS
RESPIRATION RATE: 14 BRPM | SYSTOLIC BLOOD PRESSURE: 141 MMHG | HEART RATE: 74 BPM | OXYGEN SATURATION: 98 % | DIASTOLIC BLOOD PRESSURE: 84 MMHG | TEMPERATURE: 97.3 F

## 2023-09-07 VITALS
SYSTOLIC BLOOD PRESSURE: 150 MMHG | TEMPERATURE: 97.1 F | RESPIRATION RATE: 16 BRPM | DIASTOLIC BLOOD PRESSURE: 96 MMHG | HEART RATE: 87 BPM | OXYGEN SATURATION: 100 %

## 2023-09-07 VITALS — BODY MASS INDEX: 27 KG/M2 | HEIGHT: 67 IN | WEIGHT: 172 LBS

## 2023-09-07 DIAGNOSIS — Z79.899: ICD-10-CM

## 2023-09-07 DIAGNOSIS — Z88.8: ICD-10-CM

## 2023-09-07 DIAGNOSIS — M54.2: Primary | ICD-10-CM
